# Patient Record
Sex: MALE | Race: WHITE | NOT HISPANIC OR LATINO | Employment: OTHER | URBAN - NONMETROPOLITAN AREA
[De-identification: names, ages, dates, MRNs, and addresses within clinical notes are randomized per-mention and may not be internally consistent; named-entity substitution may affect disease eponyms.]

---

## 2022-03-07 ENCOUNTER — TELEMEDICINE (OUTPATIENT)
Dept: SLEEP MEDICINE | Facility: CLINIC | Age: 37
End: 2022-03-07

## 2022-03-07 DIAGNOSIS — R06.83 SNORING: Primary | ICD-10-CM

## 2022-03-07 DIAGNOSIS — E66.09 CLASS 2 OBESITY DUE TO EXCESS CALORIES WITHOUT SERIOUS COMORBIDITY WITH BODY MASS INDEX (BMI) OF 37.0 TO 37.9 IN ADULT: ICD-10-CM

## 2022-03-07 DIAGNOSIS — G47.33 OBSTRUCTIVE SLEEP APNEA, ADULT: ICD-10-CM

## 2022-03-07 DIAGNOSIS — G89.4 CHRONIC PAIN SYNDROME: ICD-10-CM

## 2022-03-07 DIAGNOSIS — G25.81 RESTLESS LEGS SYNDROME (RLS): ICD-10-CM

## 2022-03-07 PROCEDURE — 99203 OFFICE O/P NEW LOW 30 MIN: CPT | Performed by: INTERNAL MEDICINE

## 2022-03-09 DIAGNOSIS — Z01.812 BLOOD TESTS PRIOR TO TREATMENT OR PROCEDURE: Primary | ICD-10-CM

## 2022-03-20 NOTE — PROGRESS NOTES
Chief Complaint  Snoring and nonrestorative sleep    Subjective         Rocky Webster presents to [facility] for the evaluation of snoring and nonrestorative sleep.  His primary care provider is FRANCHESCA Staples. You have chosen to receive care through a telehealth visit.  Do you consent to use a video/audio connection for your medical care today? Yes  Unable to complete visit using a video connection to the patient. A phone visit was used to complete this visits. Total time of discussion was35 minutes.  History of Present Illness  Patient says that his wife tells him that he has very restless sleep.  He tosses and turns.  He is very sleepy in the morning.  He does not feel rested.  He says it takes him a couple hours to go to sleep.  He complains of feeling dizzy when he lies down.  He felt very sleepy he says for about a year.  He thinks his weight is unchanged.  He had snoring noted for at least 6 years.  He has had apneas noted for about a year.  He had a home sleep test in January that showed an AHI of only 3.3.  He denies awakening gasping for breath.  He is is not rested on arising in the morning.  He has a morning headache 3 to 4 days/week.    He will awaken with a dry mouth.  He has trouble breathing through his nose and has allergies.  He is sleepy during the day but does not generally fall asleep.  He is occasionally tired while driving.  He has kicking of his legs at night but does not get to keep him awake.  He does have chronic pain that may keep him awake.  He had COVID about a month ago.    He goes to bed between 10 PM and 11 PM.  He will fall asleep in about an hour.  He awakens once or twice during the night.  He thinks he gets 5 to 6 hours of sleep but is not rested.  He denies taking naps during the day.  He has had hypertension known for 4 years.  He denies any history of diabetes or coronary artery disease.    Past medical history:    Allergies:  Seasonal.    Habits: Smoking: He smoked 1/2 pack/day for 8 years    Ethanol: Rare    Caffeine: He has 3 to 4 cups of coffee in the morning and drinks about a gallon of tea per day including up until supper.    Medical illnesses: He has a history of gout and hypertension    Medications: Lisinopril, allopurinol, aspirin.    Surgeries: Without    Family history is positive for cancer, diabetes, COPD.    Review of systems: Positives are fatigue, nasal congestion, postnasal drip, sinus problems, vision changes, cough, arthritis, back pain, joint pain, allergies, headaches, anxiety.  Other systems reviewed and negative.    [place] score is 12/24  Objective   Vital Signs:   There were no vitals taken for this visit.    Physical Exam patient sounds to be awake and alert.  He does not seem to be in acute respiratory distress.  His stated weight is 254 pounds.  His height 5 feet 9 inches.  Mass index 37.5.  Result Review :    Home sleep test January 11 showed an AHI of 3.3     Assessment and Plan   Diagnoses and all orders for this visit:    1. Snoring (Primary)  -     Cancel: Polysomnography 4 or More Parameters; Future  -     Polysomnography 4 or More Parameters; Future    2. Obstructive sleep apnea, adult  -     Cancel: Polysomnography 4 or More Parameters; Future  -     Polysomnography 4 or More Parameters; Future    3. Chronic pain syndrome  -     Cancel: Polysomnography 4 or More Parameters; Future  -     Polysomnography 4 or More Parameters; Future    4. Restless legs syndrome (RLS)  -     Cancel: Polysomnography 4 or More Parameters; Future  -     Polysomnography 4 or More Parameters; Future    5. Class 2 obesity due to excess calories without serious comorbidity with body mass index (BMI) of 37.0 to 37.9 in adult    Patient has a history of snoring and nonrestorative sleep.  He gives an excellent story for obstructive sleep apnea.  He previously had a nondiagnostic home sleep test.  He needs a polysomnogram for  evaluation.  He is not sure he could come to [place] or [place] for a polysomnogram and would like to do it in [place].  I told him that I did not have privileges here and he would be seen by a sleep physician..  We will be happy to do his study at [place] [facility] if he wishes.    We have discussed the long-term consequences of untreated obstructive sleep apnea.  These include hypertension, diabetes, heart disease, stroke, and dementia.  He is encouraged to lose weight.  He is encouraged to avoid alcohol and sedatives close to bedtime.  He is encouraged to practice lateral position sleep.    He also has chronic pain that could well be disrupting his sleep and also him might well have leg movements that are disrupting his sleep.  Polysomnogram not give additional information in this area.  He is encouraged to practice excellent sleep hygiene.  He is to get a regular rise time and a regular bedtime.  He is to get light exposure early in the day.  He is to try to get exercise on a regular basis.  He is to cut off caffeine consumption by no later than mid afternoon.  He is develop a nighttime ritual and get off electronic screens at least an hour before he wishes to go to sleep.  He is will need to be addressed further on his return    I spent 35 minutes caring for Rocky on this date of service. This time includes time spent by me in the following activities:reviewing tests, obtaining and/or reviewing a separately obtained history, performing a medically appropriate examination and/or evaluation , counseling and educating the patient/family/caregiver, ordering medications, tests, or procedures and documenting information in the medical record  Follow Up   Return for Follow up after study, Next scheduled follow-up, Video visit.  Patient was given instructions and counseling regarding his condition or for health maintenance advice. Please see specific information pulled into the AVS if appropriate.   Lev Machado MD  [facility]  Sleep Medicine  Pulmonary and Critical Care Medicine